# Patient Record
Sex: MALE | URBAN - METROPOLITAN AREA
[De-identification: names, ages, dates, MRNs, and addresses within clinical notes are randomized per-mention and may not be internally consistent; named-entity substitution may affect disease eponyms.]

---

## 2022-02-08 ENCOUNTER — OFFICE VISIT (OUTPATIENT)
Dept: URBAN - METROPOLITAN AREA CLINIC 25 | Facility: CLINIC | Age: 27
End: 2022-02-08

## 2024-02-01 ENCOUNTER — OV NP (OUTPATIENT)
Dept: URBAN - METROPOLITAN AREA CLINIC 115 | Facility: CLINIC | Age: 29
End: 2024-02-01
Payer: COMMERCIAL

## 2024-02-01 VITALS
BODY MASS INDEX: 31.71 KG/M2 | HEART RATE: 98 BPM | SYSTOLIC BLOOD PRESSURE: 139 MMHG | HEIGHT: 67 IN | WEIGHT: 202 LBS | DIASTOLIC BLOOD PRESSURE: 81 MMHG | TEMPERATURE: 98 F

## 2024-02-01 DIAGNOSIS — R12 HEARTBURN: ICD-10-CM

## 2024-02-01 DIAGNOSIS — R74.8 ELEVATED LIVER ENZYMES: ICD-10-CM

## 2024-02-01 DIAGNOSIS — R19.7 ACUTE DIARRHEA: ICD-10-CM

## 2024-02-01 PROCEDURE — 99203 OFFICE O/P NEW LOW 30 MIN: CPT

## 2024-02-01 NOTE — HPI-TODAY'S VISIT:
29 y/o M presents with c/o bloating and change in bowel habits x 10 yrs. Noting frequent BMs (2-3 times a day, up to 5 times sometimes), formed to loose stools, bloating on occasion, heartburn on occasion. Per mother, pt was lactose intolerant as a baby and was on soy for a while. Low abdominal pain that is relieved with BMs. States he had some episodes of pink blood on toilet paper with rectal itching 10 years ago. Labs from 11/2023 show albumin 4.6, tot bili 1.4, ALT 68, AST 33, alkP 65; Hep A and B immune, Hep C negative. States weight has gone down a little b/c of his anxiety  Denies n/v, constipation, dysphagia, odynophagia, unintentional weight loss, change in appetite, early satiety. Mother and Father had colon polyps; maternal aunt has Celiac; maternal grandmother had breast cancer. Tylenol or Advil once every month, Occ EtOH (1-2x a month, one drink at a sitting) Denies tobacco/marijuana use.

## 2024-02-06 LAB
A/G RATIO: 1.6
ALBUMIN: 4.5
ALKALINE PHOSPHATASE: 64
ALT (SGPT): 46
AST (SGOT): 22
BILIRUBIN, TOTAL: 0.9
BUN/CREATININE RATIO: (no result)
BUN: 14
CALCIUM: 9.1
CARBON DIOXIDE, TOTAL: 25
CHLORIDE: 104
CREATININE: 0.79
EGFR: 126
GLOBULIN, TOTAL: 2.9
GLUCOSE: 102
IMMUNOGLOBULIN A: 423
INTERPRETATION: (no result)
POTASSIUM: 4
PROTEIN, TOTAL: 7.4
SODIUM: 140
TISSUE TRANSGLUTAMINASE AB, IGA: <1
TSH: 0.98

## 2024-02-12 ENCOUNTER — TELNP (OUTPATIENT)
Dept: URBAN - METROPOLITAN AREA TELEHEALTH 2 | Facility: TELEHEALTH | Age: 29
End: 2024-02-12
Payer: COMMERCIAL

## 2024-02-12 DIAGNOSIS — K58.8 OTHER IRRITABLE BOWEL SYNDROME: ICD-10-CM

## 2024-02-12 PROCEDURE — 97802 MEDICAL NUTRITION INDIV IN: CPT | Performed by: DIETITIAN, REGISTERED

## 2024-09-19 ENCOUNTER — DASHBOARD ENCOUNTERS (OUTPATIENT)
Age: 29
End: 2024-09-19

## 2024-09-19 ENCOUNTER — LAB OUTSIDE AN ENCOUNTER (OUTPATIENT)
Dept: URBAN - METROPOLITAN AREA CLINIC 98 | Facility: CLINIC | Age: 29
End: 2024-09-19

## 2024-09-19 ENCOUNTER — OFFICE VISIT (OUTPATIENT)
Dept: URBAN - METROPOLITAN AREA CLINIC 98 | Facility: CLINIC | Age: 29
End: 2024-09-19
Payer: COMMERCIAL

## 2024-09-19 VITALS
DIASTOLIC BLOOD PRESSURE: 76 MMHG | SYSTOLIC BLOOD PRESSURE: 118 MMHG | TEMPERATURE: 96.6 F | HEART RATE: 108 BPM | BODY MASS INDEX: 31.99 KG/M2 | HEIGHT: 67 IN | WEIGHT: 203.8 LBS

## 2024-09-19 DIAGNOSIS — K92.1 HEMATOCHEZIA: ICD-10-CM

## 2024-09-19 DIAGNOSIS — R14.3 EXCESSIVE GAS: ICD-10-CM

## 2024-09-19 PROCEDURE — 99214 OFFICE O/P EST MOD 30 MIN: CPT | Performed by: INTERNAL MEDICINE

## 2024-09-19 RX ORDER — HYDROXYZINE HYDROCHLORIDE 25 MG/1
TAKE 1 TABLET BY MOUTH TWICE DAILY AS NEEDED TABLET, FILM COATED ORAL
Qty: 60 EACH | Refills: 0 | Status: ACTIVE | COMMUNITY

## 2024-09-19 NOTE — HPI-TODAY'S VISIT:
Mr. Awad is a 30 yo M presenting for blood in stools.  Last visit on 2/1/24 with Ashli Chavira PA-C for diarrhea, heartburn.  Saw blood in his stool last week, significant amount. Mixed into stools.  He has some excessive gas and general abdominal pain.  He is having 2 BMs per day, can be loose or soft. Rare watery diarrhea.  NO unintentional weight loss.  Family history of colon polyps but not colon cancer.  Traveled to [place] earlier this month for 1 week.  Has added more fiber to his diet recently.  Prescribed hydroxyzine for anxiety the day after bleeding started.   I reviewed:  GPP (normal, no panc elastase collected) 2/1/24 celiac: normal 2/1/24 CMP and TSH: normal except for slightly elevated glucose

## 2024-09-19 NOTE — EXAM-FUNCTIONAL ASSESSMENT
Constitutional: well-developed, normal communication ability.   Eyes: Conjunctivae and eyelids appear normal, no scleral icterus. Respiratory:  symmetric expansion of chest wall, normal work of breathing   Gastrointestinal:  normoactive bowel sounds, soft, no tenderness, no rebound tenderness, no shifting dullness, no organomegaly, Rectal exam- no hemorrhoids, no blood in the stool, normal maneuvers. Chaperone present.   Musculoskeletal: normal gait and station, no tenderness present.   Skin: No jaundice   Neurologic: Oriented to person, place, time. Short term memory intact.    Psychiatric: Normal mood and appropriate affect.

## 2024-09-23 ENCOUNTER — TELEPHONE ENCOUNTER (OUTPATIENT)
Dept: URBAN - METROPOLITAN AREA CLINIC 98 | Facility: CLINIC | Age: 29
End: 2024-09-23

## 2024-09-26 ENCOUNTER — OFFICE VISIT (OUTPATIENT)
Dept: URBAN - METROPOLITAN AREA SURGERY CENTER 18 | Facility: SURGERY CENTER | Age: 29
End: 2024-09-26

## 2024-09-26 RX ORDER — HYDROXYZINE HYDROCHLORIDE 25 MG/1
TAKE 1 TABLET BY MOUTH TWICE DAILY AS NEEDED TABLET, FILM COATED ORAL
Qty: 60 EACH | Refills: 0 | Status: ACTIVE | COMMUNITY